# Patient Record
Sex: FEMALE | Race: WHITE | NOT HISPANIC OR LATINO | Employment: OTHER | RURAL
[De-identification: names, ages, dates, MRNs, and addresses within clinical notes are randomized per-mention and may not be internally consistent; named-entity substitution may affect disease eponyms.]

---

## 2021-05-10 RX ORDER — GABAPENTIN 300 MG/1
300 CAPSULE ORAL 2 TIMES DAILY
Qty: 180 CAPSULE | Refills: 1 | Status: SHIPPED | OUTPATIENT
Start: 2021-05-10 | End: 2021-05-25 | Stop reason: SDUPTHER

## 2021-05-25 DIAGNOSIS — G43.709 CHRONIC MIGRAINE W/O AURA, NOT INTRACTABLE, W/O STAT MIGR: Primary | ICD-10-CM

## 2021-05-25 RX ORDER — GABAPENTIN 300 MG/1
300 CAPSULE ORAL 2 TIMES DAILY
Qty: 180 CAPSULE | Refills: 1 | Status: SHIPPED | OUTPATIENT
Start: 2021-05-25 | End: 2021-08-25 | Stop reason: SDUPTHER

## 2021-08-11 RX ORDER — RIZATRIPTAN BENZOATE 10 MG/1
TABLET ORAL
COMMUNITY
Start: 2021-05-03 | End: 2022-02-28 | Stop reason: SDUPTHER

## 2021-08-11 RX ORDER — LEVOTHYROXINE SODIUM 125 UG/1
TABLET ORAL
COMMUNITY
Start: 2021-04-27

## 2021-08-11 RX ORDER — ATORVASTATIN CALCIUM 40 MG/1
TABLET, FILM COATED ORAL
COMMUNITY
Start: 2021-03-10

## 2021-08-25 ENCOUNTER — OFFICE VISIT (OUTPATIENT)
Dept: NEUROLOGY | Facility: CLINIC | Age: 71
End: 2021-08-25
Payer: MEDICARE

## 2021-08-25 VITALS
BODY MASS INDEX: 25.61 KG/M2 | HEART RATE: 52 BPM | SYSTOLIC BLOOD PRESSURE: 112 MMHG | DIASTOLIC BLOOD PRESSURE: 70 MMHG | OXYGEN SATURATION: 99 % | WEIGHT: 150 LBS | HEIGHT: 64 IN | RESPIRATION RATE: 18 BRPM

## 2021-08-25 DIAGNOSIS — G43.709 CHRONIC MIGRAINE W/O AURA, NOT INTRACTABLE, W/O STAT MIGR: ICD-10-CM

## 2021-08-25 PROCEDURE — 99214 OFFICE O/P EST MOD 30 MIN: CPT | Mod: PBBFAC | Performed by: NURSE PRACTITIONER

## 2021-08-25 PROCEDURE — 99213 PR OFFICE/OUTPT VISIT, EST, LEVL III, 20-29 MIN: ICD-10-PCS | Mod: S$PBB,,, | Performed by: NURSE PRACTITIONER

## 2021-08-25 PROCEDURE — 99213 OFFICE O/P EST LOW 20 MIN: CPT | Mod: S$PBB,,, | Performed by: NURSE PRACTITIONER

## 2021-08-25 RX ORDER — GABAPENTIN 300 MG/1
300 CAPSULE ORAL 2 TIMES DAILY
Qty: 180 CAPSULE | Refills: 1 | Status: SHIPPED | OUTPATIENT
Start: 2021-08-25 | End: 2022-06-16

## 2022-02-28 ENCOUNTER — OFFICE VISIT (OUTPATIENT)
Dept: NEUROLOGY | Facility: CLINIC | Age: 72
End: 2022-02-28
Payer: MEDICARE

## 2022-02-28 VITALS
WEIGHT: 149 LBS | HEIGHT: 64 IN | BODY MASS INDEX: 25.44 KG/M2 | SYSTOLIC BLOOD PRESSURE: 114 MMHG | DIASTOLIC BLOOD PRESSURE: 78 MMHG | HEART RATE: 67 BPM | OXYGEN SATURATION: 97 % | RESPIRATION RATE: 18 BRPM

## 2022-02-28 DIAGNOSIS — E55.9 VITAMIN D DEFICIENCY: ICD-10-CM

## 2022-02-28 DIAGNOSIS — G43.709 CHRONIC MIGRAINE W/O AURA, NOT INTRACTABLE, W/O STAT MIGR: Primary | Chronic | ICD-10-CM

## 2022-02-28 DIAGNOSIS — E53.8 VITAMIN B12 DEFICIENCY: ICD-10-CM

## 2022-02-28 PROCEDURE — 99214 OFFICE O/P EST MOD 30 MIN: CPT | Mod: PBBFAC | Performed by: NURSE PRACTITIONER

## 2022-02-28 PROCEDURE — 99213 PR OFFICE/OUTPT VISIT, EST, LEVL III, 20-29 MIN: ICD-10-PCS | Mod: S$PBB,,, | Performed by: NURSE PRACTITIONER

## 2022-02-28 PROCEDURE — 99213 OFFICE O/P EST LOW 20 MIN: CPT | Mod: S$PBB,,, | Performed by: NURSE PRACTITIONER

## 2022-02-28 RX ORDER — RIZATRIPTAN BENZOATE 10 MG/1
TABLET ORAL
Qty: 10 TABLET | Refills: 3 | Status: SHIPPED | OUTPATIENT
Start: 2022-02-28 | End: 2022-06-02

## 2022-02-28 NOTE — PATIENT INSTRUCTIONS
1. Continue neurontin 300mg three times a day as directed    2. Continue maxalt as needed for migraine    3. Vitamin D and B12 labs

## 2022-02-28 NOTE — PROGRESS NOTES
Subjective:       Patient ID: Ciro Gutierrez is a 71 y.o. female     Chief Complaint:    Chief Complaint   Patient presents with    Follow-up    Migraine        Allergies:  Patient has no known allergies.    Current Medications:    Outpatient Encounter Medications as of 2/28/2022   Medication Sig Dispense Refill    atorvastatin (LIPITOR) 40 MG tablet       gabapentin (NEURONTIN) 300 MG capsule Take 1 capsule (300 mg total) by mouth 2 (two) times daily. 180 capsule 1    levothyroxine (SYNTHROID) 125 MCG tablet       [DISCONTINUED] rizatriptan (MAXALT) 10 MG tablet       rizatriptan (MAXALT) 10 MG tablet Take 1 tablet at onset of headache.  May repeat in 2 hours.  No more than 3 days of the week 10 tablet 3     No facility-administered encounter medications on file as of 2/28/2022.       History of Present Illness  72 y/o female with h/o migraines.     Currently on Neurontin 300mg BID and tolerating very well.  Reports maybe 3 headaches a month and easily relieved with Maxalt, typically one dose will eliminate the headache.      Prior on Propranolol which worked well, but she felt too tired.  Prior topamax was avoided due to history of kidney stones.    Denies any other neurological symptoms.         Review of Systems  Review of Systems   Constitutional: Negative for diaphoresis and fever.   HENT: Negative for congestion, hearing loss and tinnitus.    Eyes: Negative for blurred vision, double vision, photophobia, discharge and redness.   Respiratory: Negative for cough and shortness of breath.    Cardiovascular: Negative for chest pain.   Gastrointestinal: Negative for abdominal pain, nausea and vomiting.   Musculoskeletal: Negative for back pain, joint pain, myalgias and neck pain.   Skin: Negative for itching and rash.   Neurological: Positive for headaches. Negative for dizziness, tremors, sensory change, speech change, focal weakness, seizures, loss of consciousness and weakness.    Psychiatric/Behavioral: Negative for depression, hallucinations and memory loss. The patient does not have insomnia.    All other systems reviewed and are negative.     Objective:     NEUROLOGICAL EXAMINATION:     MENTAL STATUS   Oriented to person, place, and time.   Registration: recalls 3 of 3 objects. Recall at 5 minutes: recalls 3 of 3 objects.   Attention: normal. Concentration: normal.   Speech: speech is normal   Level of consciousness: alert  Knowledge: good and consistent with education.   Normal comprehension.     CRANIAL NERVES     CN II   Visual fields full to confrontation.   Visual acuity: normal  Right visual field deficit: none  Left visual field deficit: none     CN III, IV, VI   Pupils are equal, round, and reactive to light.  Extraocular motions are normal.   Right pupil: Size: 3 mm. Shape: regular. Reactivity: brisk. Consensual response: intact. Accommodation: intact.   Left pupil: Size: 3 mm. Shape: regular. Reactivity: brisk. Consensual response: intact. Accommodation: intact.   CN III: no CN III palsy  CN VI: no CN VI palsy  Nystagmus: none   Diplopia: none  Upgaze: normal  Downgaze: normal  Conjugate gaze: present  Vestibulo-ocular reflex: present    CN V   Facial sensation intact.   Right facial sensation deficit: none  Left facial sensation deficit: none  Right corneal reflex: normal  Left corneal reflex: normal    CN VII   Facial expression full, symmetric.   Right facial weakness: none  Left facial weakness: none  Right taste: normal  Left taste: normal    CN VIII   CN VIII normal.   Hearing: intact    CN IX, X   CN IX normal.   CN X normal.   Palate: symmetric    CN XI   CN XI normal.   Right sternocleidomastoid strength: normal  Left sternocleidomastoid strength: normal  Right trapezius strength: normal  Left trapezius strength: normal    CN XII   CN XII normal.   Tongue: not atrophic  Fasciculations: absent  Tongue deviation: none    MOTOR EXAM   Muscle bulk: normal  Overall muscle  tone: normal  Right arm tone: normal  Left arm tone: normal  Right arm pronator drift: absent  Left arm pronator drift: absent  Right leg tone: normal  Left leg tone: normal    Strength   Right neck flexion: 5/5  Left neck flexion: 5/5  Right neck extension: 5/5  Left neck extension: 5/5  Right deltoid: 5/5  Left deltoid: 5/5  Right biceps:   Left biceps:   Right triceps:   Left triceps: /  Right wrist flexion:   Left wrist flexion: /  Right wrist extension: 5/5  Left wrist extension:   Right interossei:   Left interossei:   Right iliopsoas:   Left iliopsoas:   Right quadriceps:   Left quadriceps:   Right hamstrin/5  Left hamstrin/5  Right anterior tibial:   Left anterior tibial:   Right posterior tibial:   Left posterior tibial:   Right gastroc:   Left gastroc:     REFLEXES     Reflexes   Right brachioradialis: 2+  Left brachioradialis: 2+  Right biceps: 2+  Left biceps: 2+  Right triceps: 2+  Left triceps: 2+  Right patellar: 2+  Left patellar: 2+  Right achilles: 2+  Left achilles: 2+  Right plantar: normal  Left plantar: normal  Right Thorne: absent  Left Thorne: absent  Right ankle clonus: absent  Left ankle clonus: absent  Right pendular knee jerk: absent  Left pendular knee jerk: absent    SENSORY EXAM   Light touch normal.   Right arm light touch: normal  Left arm light touch: normal  Right leg light touch: normal  Left leg light touch: normal  Vibration normal.   Right arm vibration: normal  Left arm vibration: normal  Right leg vibration: normal  Left leg vibration: normal  Proprioception normal.   Right arm proprioception: normal  Left arm proprioception: normal  Right leg proprioception: normal  Left leg proprioception: normal  Pinprick normal.   Right arm pinprick: normal  Left arm pinprick: normal  Right leg pinprick: normal  Left leg pinprick: normal  Graphesthesia: normal  Romberg: negative  Stereognosis: normal    GAIT AND COORDINATION      Gait  Gait: normal     Coordination   Finger to nose coordination: normal  Heel to shin coordination: normal  Tandem walking coordination: normal    Tremor   Resting tremor: absent  Intention tremor: absent  Action tremor: absent       Physical Exam  Vitals and nursing note reviewed.   Constitutional:       Appearance: Normal appearance.   HENT:      Head: Normocephalic.   Eyes:      Extraocular Movements: Extraocular movements intact and EOM normal.      Pupils: Pupils are equal, round, and reactive to light.   Cardiovascular:      Rate and Rhythm: Normal rate and regular rhythm.   Pulmonary:      Effort: Pulmonary effort is normal.      Breath sounds: Normal breath sounds.   Musculoskeletal:         General: No swelling or tenderness. Normal range of motion.      Cervical back: Normal range of motion and neck supple.      Right lower leg: No edema.      Left lower leg: No edema.   Skin:     General: Skin is warm and dry.      Coloration: Skin is not jaundiced.      Findings: No rash.   Neurological:      General: No focal deficit present.      Mental Status: She is alert and oriented to person, place, and time.      GCS: GCS eye subscore is 4. GCS verbal subscore is 5. GCS motor subscore is 6.      Cranial Nerves: No cranial nerve deficit.      Sensory: No sensory deficit.      Motor: Motor function is intact. No weakness.      Coordination: Coordination is intact. Coordination normal. Finger-Nose-Finger Test, Heel to Shin Test and Romberg Test normal.      Gait: Gait is intact. Gait and tandem walk normal.      Deep Tendon Reflexes: Reflexes normal.      Reflex Scores:       Tricep reflexes are 2+ on the right side and 2+ on the left side.       Bicep reflexes are 2+ on the right side and 2+ on the left side.       Brachioradialis reflexes are 2+ on the right side and 2+ on the left side.       Patellar reflexes are 2+ on the right side and 2+ on the left side.       Achilles reflexes are 2+ on the right side  and 2+ on the left side.  Psychiatric:         Mood and Affect: Mood normal.         Speech: Speech normal.         Behavior: Behavior normal.          Assessment:     Problem List Items Addressed This Visit        Neuro    Chronic migraine w/o aura, not intractable, w/o stat migr - Primary (Chronic)    Relevant Medications    rizatriptan (MAXALT) 10 MG tablet       Endocrine    Vitamin B12 deficiency    Relevant Orders    Vitamin B12 & Folate    Vitamin D deficiency    Relevant Orders    Vitamin D           Primary Diagnosis and ICD10  Chronic migraine w/o aura, not intractable, w/o stat migr [G43.709]    Plan:     Patient Instructions   1. Continue neurontin 300mg three times a day as directed    2. Continue maxalt as needed for migraine    3. Vitamin D and B12 labs      Medications Discontinued During This Encounter   Medication Reason    rizatriptan (MAXALT) 10 MG tablet Reorder       Requested Prescriptions     Signed Prescriptions Disp Refills    rizatriptan (MAXALT) 10 MG tablet 10 tablet 3     Sig: Take 1 tablet at onset of headache.  May repeat in 2 hours.  No more than 3 days of the week       Orders Placed This Encounter   Procedures    Vitamin D    Vitamin B12 & Folate

## 2022-05-31 DIAGNOSIS — G43.709 CHRONIC MIGRAINE W/O AURA, NOT INTRACTABLE, W/O STAT MIGR: Chronic | ICD-10-CM

## 2022-06-02 RX ORDER — RIZATRIPTAN BENZOATE 10 MG/1
TABLET ORAL
Qty: 36 TABLET | Refills: 2 | Status: SHIPPED | OUTPATIENT
Start: 2022-06-02 | End: 2022-09-29

## 2022-06-15 DIAGNOSIS — G43.709 CHRONIC MIGRAINE W/O AURA, NOT INTRACTABLE, W/O STAT MIGR: ICD-10-CM

## 2022-06-16 RX ORDER — GABAPENTIN 300 MG/1
CAPSULE ORAL
Qty: 180 CAPSULE | Refills: 1 | Status: SHIPPED | OUTPATIENT
Start: 2022-06-16 | End: 2022-09-29 | Stop reason: SDUPTHER

## 2022-09-29 ENCOUNTER — OFFICE VISIT (OUTPATIENT)
Dept: NEUROLOGY | Facility: CLINIC | Age: 72
End: 2022-09-29
Payer: MEDICARE

## 2022-09-29 VITALS
DIASTOLIC BLOOD PRESSURE: 74 MMHG | OXYGEN SATURATION: 98 % | HEART RATE: 57 BPM | BODY MASS INDEX: 26.63 KG/M2 | SYSTOLIC BLOOD PRESSURE: 122 MMHG | RESPIRATION RATE: 18 BRPM | WEIGHT: 156 LBS | HEIGHT: 64 IN

## 2022-09-29 DIAGNOSIS — G43.709 CHRONIC MIGRAINE W/O AURA, NOT INTRACTABLE, W/O STAT MIGR: Primary | Chronic | ICD-10-CM

## 2022-09-29 PROCEDURE — 99213 OFFICE O/P EST LOW 20 MIN: CPT | Mod: S$PBB,,, | Performed by: NURSE PRACTITIONER

## 2022-09-29 PROCEDURE — 99213 PR OFFICE/OUTPT VISIT, EST, LEVL III, 20-29 MIN: ICD-10-PCS | Mod: S$PBB,,, | Performed by: NURSE PRACTITIONER

## 2022-09-29 PROCEDURE — 99214 OFFICE O/P EST MOD 30 MIN: CPT | Mod: PBBFAC | Performed by: NURSE PRACTITIONER

## 2022-09-29 RX ORDER — RIZATRIPTAN BENZOATE 10 MG/1
TABLET ORAL
Qty: 12 TABLET | Refills: 2 | Status: SHIPPED | OUTPATIENT
Start: 2022-09-29 | End: 2023-10-02 | Stop reason: SDUPTHER

## 2022-09-29 RX ORDER — GABAPENTIN 300 MG/1
300 CAPSULE ORAL 2 TIMES DAILY
Qty: 180 CAPSULE | Refills: 1 | Status: SHIPPED | OUTPATIENT
Start: 2022-09-29 | End: 2023-03-29 | Stop reason: SDUPTHER

## 2022-09-29 NOTE — PROGRESS NOTES
Subjective:       Patient ID: Ciro Gutierrez is a 72 y.o. female     Chief Complaint:    Chief Complaint   Patient presents with    Follow-up    Migraine          Allergies:  Patient has no known allergies.    Current Medications:    Outpatient Encounter Medications as of 9/29/2022   Medication Sig Dispense Refill    atorvastatin (LIPITOR) 40 MG tablet       levothyroxine (SYNTHROID) 125 MCG tablet       [DISCONTINUED] gabapentin (NEURONTIN) 300 MG capsule TAKE 1 CAPSULE TWICE DAILY 180 capsule 1    [DISCONTINUED] rizatriptan (MAXALT) 10 MG tablet TAKE 1 TABLET AT ONSET OF HEADACHE.  MAY REPEAT IN 2 HOURS (NO MORE THAN 3 DAYS OF THE WEEK AS INSTRUCTED) 36 tablet 2    gabapentin (NEURONTIN) 300 MG capsule Take 1 capsule (300 mg total) by mouth 2 (two) times daily. 180 capsule 1    rizatriptan (MAXALT) 10 MG tablet TAKE 1 TABLET AT ONSET OF HEADACHE.  MAY REPEAT IN 2 HOURS (NO MORE THAN 3 DAYS OF THE WEEK AS INSTRUCTED) 12 tablet 2     No facility-administered encounter medications on file as of 9/29/2022.       History of Present Illness  73 y/o female with h/o migraines.     Currently on Neurontin 300mg BID and tolerating very well.  Reports maybe 3 headaches a month and easily relieved with Maxalt, typically one dose will eliminate the headache.      Prior on Propranolol which worked well, but she felt too tired.  Prior topamax was avoided due to history of kidney stones.    Denies any other neurological symptoms.         Review of Systems  Review of Systems   Constitutional:  Negative for diaphoresis and fever.   HENT:  Negative for congestion, hearing loss and tinnitus.    Eyes:  Negative for blurred vision, double vision, photophobia, discharge and redness.   Respiratory:  Negative for cough and shortness of breath.    Cardiovascular:  Negative for chest pain.   Gastrointestinal:  Negative for abdominal pain, nausea and vomiting.   Musculoskeletal:  Negative for back pain, joint pain, myalgias and neck  pain.   Skin:  Negative for itching and rash.   Neurological:  Positive for headaches. Negative for dizziness, tremors, sensory change, speech change, focal weakness, seizures, loss of consciousness and weakness.   Psychiatric/Behavioral:  Negative for depression, hallucinations and memory loss. The patient does not have insomnia.    All other systems reviewed and are negative.   Objective:     NEUROLOGICAL EXAMINATION:     MENTAL STATUS   Oriented to person, place, and time.   Registration: recalls 3 of 3 objects. Recall at 5 minutes: recalls 3 of 3 objects.   Attention: normal. Concentration: normal.   Speech: speech is normal   Level of consciousness: alert  Knowledge: good and consistent with education.   Normal comprehension.     CRANIAL NERVES     CN II   Visual fields full to confrontation.   Visual acuity: normal  Right visual field deficit: none  Left visual field deficit: none     CN III, IV, VI   Pupils are equal, round, and reactive to light.  Extraocular motions are normal.   Right pupil: Size: 3 mm. Shape: regular. Reactivity: brisk. Consensual response: intact. Accommodation: intact.   Left pupil: Size: 3 mm. Shape: regular. Reactivity: brisk. Consensual response: intact. Accommodation: intact.   CN III: no CN III palsy  CN VI: no CN VI palsy  Nystagmus: none   Diplopia: none  Upgaze: normal  Downgaze: normal  Conjugate gaze: present  Vestibulo-ocular reflex: present    CN V   Facial sensation intact.   Right facial sensation deficit: none  Left facial sensation deficit: none  Right corneal reflex: normal  Left corneal reflex: normal    CN VII   Facial expression full, symmetric.   Right facial weakness: none  Left facial weakness: none  Right taste: normal  Left taste: normal    CN VIII   CN VIII normal.   Hearing: intact    CN IX, X   CN IX normal.   CN X normal.   Palate: symmetric    CN XI   CN XI normal.   Right sternocleidomastoid strength: normal  Left sternocleidomastoid strength:  normal  Right trapezius strength: normal  Left trapezius strength: normal    CN XII   CN XII normal.   Tongue: not atrophic  Fasciculations: absent  Tongue deviation: none    MOTOR EXAM   Muscle bulk: normal  Overall muscle tone: normal  Right arm tone: normal  Left arm tone: normal  Right arm pronator drift: absent  Left arm pronator drift: absent  Right leg tone: normal  Left leg tone: normal    Strength   Right neck flexion: 5/5  Left neck flexion: 5/5  Right neck extension: 5/5  Left neck extension: 5/5  Right deltoid: 5/5  Left deltoid: 5/5  Right biceps: 5/5  Left biceps: 5/5  Right triceps: 5/5  Left triceps: 5/5  Right wrist flexion: 5/5  Left wrist flexion: 5/5  Right wrist extension: 5/5  Left wrist extension: 5/5  Right interossei: 5/5  Left interossei: 5/5  Right iliopsoas: 5/5  Left iliopsoas: 5/5  Right quadriceps: 5/5  Left quadriceps: 5/5  Right hamstrin/5  Left hamstrin/5  Right anterior tibial: 5/5  Left anterior tibial: 5/5  Right posterior tibial: 5/5  Left posterior tibial: 5/5  Right gastroc: 5/5  Left gastroc: 5/5    REFLEXES     Reflexes   Right brachioradialis: 2+  Left brachioradialis: 2+  Right biceps: 2+  Left biceps: 2+  Right triceps: 2+  Left triceps: 2+  Right patellar: 2+  Left patellar: 2+  Right achilles: 2+  Left achilles: 2+  Right plantar: normal  Left plantar: normal  Right Thorne: absent  Left Thorne: absent  Right ankle clonus: absent  Left ankle clonus: absent  Right pendular knee jerk: absent  Left pendular knee jerk: absent    SENSORY EXAM   Light touch normal.   Right arm light touch: normal  Left arm light touch: normal  Right leg light touch: normal  Left leg light touch: normal  Vibration normal.   Right arm vibration: normal  Left arm vibration: normal  Right leg vibration: normal  Left leg vibration: normal  Proprioception normal.   Right arm proprioception: normal  Left arm proprioception: normal  Right leg proprioception: normal  Left leg proprioception:  normal  Pinprick normal.   Right arm pinprick: normal  Left arm pinprick: normal  Right leg pinprick: normal  Left leg pinprick: normal  Graphesthesia: normal  Romberg: negative  Stereognosis: normal    GAIT AND COORDINATION     Gait  Gait: normal     Coordination   Finger to nose coordination: normal  Heel to shin coordination: normal  Tandem walking coordination: normal    Tremor   Resting tremor: absent  Intention tremor: absent  Action tremor: absent     Physical Exam  Vitals and nursing note reviewed.   Constitutional:       Appearance: Normal appearance.   HENT:      Head: Normocephalic.   Eyes:      Extraocular Movements: Extraocular movements intact and EOM normal.      Pupils: Pupils are equal, round, and reactive to light.   Cardiovascular:      Rate and Rhythm: Normal rate and regular rhythm.   Pulmonary:      Effort: Pulmonary effort is normal.      Breath sounds: Normal breath sounds.   Musculoskeletal:         General: No swelling or tenderness. Normal range of motion.      Cervical back: Normal range of motion and neck supple.      Right lower leg: No edema.      Left lower leg: No edema.   Skin:     General: Skin is warm and dry.      Coloration: Skin is not jaundiced.      Findings: No rash.   Neurological:      General: No focal deficit present.      Mental Status: She is alert and oriented to person, place, and time.      GCS: GCS eye subscore is 4. GCS verbal subscore is 5. GCS motor subscore is 6.      Cranial Nerves: No cranial nerve deficit.      Sensory: No sensory deficit.      Motor: Motor function is intact. No weakness.      Coordination: Coordination is intact. Coordination normal. Finger-Nose-Finger Test, Heel to Shin Test and Romberg Test normal.      Gait: Gait is intact. Gait and tandem walk normal.      Deep Tendon Reflexes: Reflexes normal.      Reflex Scores:       Tricep reflexes are 2+ on the right side and 2+ on the left side.       Bicep reflexes are 2+ on the right side and  2+ on the left side.       Brachioradialis reflexes are 2+ on the right side and 2+ on the left side.       Patellar reflexes are 2+ on the right side and 2+ on the left side.       Achilles reflexes are 2+ on the right side and 2+ on the left side.  Psychiatric:         Mood and Affect: Mood normal.         Speech: Speech normal.         Behavior: Behavior normal.        Assessment:     Problem List Items Addressed This Visit          Neuro    Chronic migraine w/o aura, not intractable, w/o stat migr - Primary (Chronic)    Relevant Medications    gabapentin (NEURONTIN) 300 MG capsule    rizatriptan (MAXALT) 10 MG tablet        Primary Diagnosis and ICD10  Chronic migraine w/o aura, not intractable, w/o stat migr [G43.709]    Plan:     Patient Instructions   1. Continue neurontin 300mg three times a day as directed    2. Continue maxalt as needed for migraine    3. Continue current plan of care    Medications Discontinued During This Encounter   Medication Reason    rizatriptan (MAXALT) 10 MG tablet     gabapentin (NEURONTIN) 300 MG capsule Reorder         Requested Prescriptions     Signed Prescriptions Disp Refills    gabapentin (NEURONTIN) 300 MG capsule 180 capsule 1     Sig: Take 1 capsule (300 mg total) by mouth 2 (two) times daily.    rizatriptan (MAXALT) 10 MG tablet 12 tablet 2     Sig: TAKE 1 TABLET AT ONSET OF HEADACHE.  MAY REPEAT IN 2 HOURS (NO MORE THAN 3 DAYS OF THE WEEK AS INSTRUCTED)       No orders of the defined types were placed in this encounter.

## 2022-09-29 NOTE — PATIENT INSTRUCTIONS
1. Continue neurontin 300mg three times a day as directed    2. Continue maxalt as needed for migraine    3. Continue current plan of care

## 2023-03-29 ENCOUNTER — OFFICE VISIT (OUTPATIENT)
Dept: NEUROLOGY | Facility: CLINIC | Age: 73
End: 2023-03-29
Payer: MEDICARE

## 2023-03-29 VITALS
HEIGHT: 64 IN | BODY MASS INDEX: 26.29 KG/M2 | RESPIRATION RATE: 18 BRPM | SYSTOLIC BLOOD PRESSURE: 130 MMHG | WEIGHT: 154 LBS | OXYGEN SATURATION: 98 % | HEART RATE: 62 BPM | DIASTOLIC BLOOD PRESSURE: 78 MMHG

## 2023-03-29 DIAGNOSIS — G43.709 CHRONIC MIGRAINE W/O AURA, NOT INTRACTABLE, W/O STAT MIGR: Primary | Chronic | ICD-10-CM

## 2023-03-29 PROCEDURE — 99214 OFFICE O/P EST MOD 30 MIN: CPT | Mod: PBBFAC | Performed by: NURSE PRACTITIONER

## 2023-03-29 PROCEDURE — 99213 PR OFFICE/OUTPT VISIT, EST, LEVL III, 20-29 MIN: ICD-10-PCS | Mod: S$PBB,,, | Performed by: NURSE PRACTITIONER

## 2023-03-29 PROCEDURE — 99213 OFFICE O/P EST LOW 20 MIN: CPT | Mod: S$PBB,,, | Performed by: NURSE PRACTITIONER

## 2023-03-29 RX ORDER — GABAPENTIN 300 MG/1
300 CAPSULE ORAL 2 TIMES DAILY
Qty: 180 CAPSULE | Refills: 1 | Status: SHIPPED | OUTPATIENT
Start: 2023-03-29 | End: 2023-08-23

## 2023-03-29 NOTE — PROGRESS NOTES
Subjective:       Patient ID: Ciro Gutierrez is a 72 y.o. female     Chief Complaint:    Chief Complaint   Patient presents with    Follow-up    Migraine          Allergies:  Patient has no known allergies.    Current Medications:    Outpatient Encounter Medications as of 3/29/2023   Medication Sig Dispense Refill    atorvastatin (LIPITOR) 40 MG tablet       levothyroxine (SYNTHROID) 125 MCG tablet       rizatriptan (MAXALT) 10 MG tablet TAKE 1 TABLET AT ONSET OF HEADACHE.  MAY REPEAT IN 2 HOURS (NO MORE THAN 3 DAYS OF THE WEEK AS INSTRUCTED) 12 tablet 2    [DISCONTINUED] gabapentin (NEURONTIN) 300 MG capsule Take 1 capsule (300 mg total) by mouth 2 (two) times daily. 180 capsule 1    gabapentin (NEURONTIN) 300 MG capsule Take 1 capsule (300 mg total) by mouth 2 (two) times daily. 180 capsule 1     No facility-administered encounter medications on file as of 3/29/2023.       History of Present Illness  71 y/o female with h/o migraines.     Currently on Neurontin 300mg BID and tolerating very well.  Reports maybe 3 headaches a month and easily relieved with Maxalt, typically one dose will eliminate the headache.      Prior on Propranolol which worked well, but she felt too tired.  Prior topamax was avoided due to history of kidney stones.    Denies any other neurological symptoms.         Review of Systems  Review of Systems   Constitutional:  Negative for diaphoresis and fever.   HENT:  Negative for congestion, hearing loss and tinnitus.    Eyes:  Negative for blurred vision, double vision, photophobia, discharge and redness.   Respiratory:  Negative for cough and shortness of breath.    Cardiovascular:  Negative for chest pain.   Gastrointestinal:  Negative for abdominal pain, nausea and vomiting.   Musculoskeletal:  Negative for back pain, joint pain, myalgias and neck pain.   Skin:  Negative for itching and rash.   Neurological:  Positive for headaches. Negative for dizziness, tremors, sensory change, speech  change, focal weakness, seizures, loss of consciousness and weakness.   Psychiatric/Behavioral:  Negative for depression, hallucinations and memory loss. The patient does not have insomnia.    All other systems reviewed and are negative.   Objective:     NEUROLOGICAL EXAMINATION:     MENTAL STATUS   Oriented to person, place, and time.   Registration: recalls 3 of 3 objects. Recall at 5 minutes: recalls 3 of 3 objects.   Attention: normal. Concentration: normal.   Speech: speech is normal   Level of consciousness: alert  Knowledge: good and consistent with education.   Normal comprehension.     CRANIAL NERVES     CN II   Visual fields full to confrontation.   Visual acuity: normal  Right visual field deficit: none  Left visual field deficit: none     CN III, IV, VI   Pupils are equal, round, and reactive to light.  Extraocular motions are normal.   Right pupil: Size: 3 mm. Shape: regular. Reactivity: brisk. Consensual response: intact. Accommodation: intact.   Left pupil: Size: 3 mm. Shape: regular. Reactivity: brisk. Consensual response: intact. Accommodation: intact.   CN III: no CN III palsy  CN VI: no CN VI palsy  Nystagmus: none   Diplopia: none  Upgaze: normal  Downgaze: normal  Conjugate gaze: present  Vestibulo-ocular reflex: present    CN V   Facial sensation intact.   Right facial sensation deficit: none  Left facial sensation deficit: none  Right corneal reflex: normal  Left corneal reflex: normal    CN VII   Facial expression full, symmetric.   Right facial weakness: none  Left facial weakness: none  Right taste: normal  Left taste: normal    CN VIII   CN VIII normal.   Hearing: intact    CN IX, X   CN IX normal.   CN X normal.   Palate: symmetric    CN XI   CN XI normal.   Right sternocleidomastoid strength: normal  Left sternocleidomastoid strength: normal  Right trapezius strength: normal  Left trapezius strength: normal    CN XII   CN XII normal.   Tongue: not atrophic  Fasciculations: absent  Tongue  deviation: none    MOTOR EXAM   Muscle bulk: normal  Overall muscle tone: normal  Right arm tone: normal  Left arm tone: normal  Right arm pronator drift: absent  Left arm pronator drift: absent  Right leg tone: normal  Left leg tone: normal    Strength   Right neck flexion: 5/5  Left neck flexion: 5/5  Right neck extension: 5/5  Left neck extension:   Right deltoid:   Left deltoid:   Right biceps:   Left biceps: 5  Right triceps: /5  Left triceps: /  Right wrist flexion:   Left wrist flexion: /  Right wrist extension:   Left wrist extension:   Right interossei:   Left interossei:   Right iliopsoas:   Left iliopsoas:   Right quadriceps:   Left quadriceps:   Right hamstrin/5  Left hamstrin/5  Right anterior tibial:   Left anterior tibial:   Right posterior tibial:   Left posterior tibial:   Right gastroc:   Left gastroc:     REFLEXES     Reflexes   Right brachioradialis: 2+  Left brachioradialis: 2+  Right biceps: 2+  Left biceps: 2+  Right triceps: 2+  Left triceps: 2+  Right patellar: 2+  Left patellar: 2+  Right achilles: 2+  Left achilles: 2+  Right plantar: normal  Left plantar: normal  Right Thorne: absent  Left Thorne: absent  Right ankle clonus: absent  Left ankle clonus: absent  Right pendular knee jerk: absent  Left pendular knee jerk: absent    SENSORY EXAM   Light touch normal.   Right arm light touch: normal  Left arm light touch: normal  Right leg light touch: normal  Left leg light touch: normal  Vibration normal.   Right arm vibration: normal  Left arm vibration: normal  Right leg vibration: normal  Left leg vibration: normal  Proprioception normal.   Right arm proprioception: normal  Left arm proprioception: normal  Right leg proprioception: normal  Left leg proprioception: normal  Pinprick normal.   Right arm pinprick: normal  Left arm pinprick: normal  Right leg pinprick: normal  Left leg pinprick: normal  Graphesthesia:  normal  Romberg: negative  Stereognosis: normal    GAIT AND COORDINATION     Gait  Gait: normal     Coordination   Finger to nose coordination: normal  Heel to shin coordination: normal  Tandem walking coordination: normal    Tremor   Resting tremor: absent  Intention tremor: absent  Action tremor: absent     Physical Exam  Vitals and nursing note reviewed.   Constitutional:       Appearance: Normal appearance.   HENT:      Head: Normocephalic.   Eyes:      Extraocular Movements: Extraocular movements intact and EOM normal.      Pupils: Pupils are equal, round, and reactive to light.   Cardiovascular:      Rate and Rhythm: Normal rate and regular rhythm.   Pulmonary:      Effort: Pulmonary effort is normal.      Breath sounds: Normal breath sounds.   Musculoskeletal:         General: No swelling or tenderness. Normal range of motion.      Cervical back: Normal range of motion and neck supple.      Right lower leg: No edema.      Left lower leg: No edema.   Skin:     General: Skin is warm and dry.      Coloration: Skin is not jaundiced.      Findings: No rash.   Neurological:      General: No focal deficit present.      Mental Status: She is alert and oriented to person, place, and time.      GCS: GCS eye subscore is 4. GCS verbal subscore is 5. GCS motor subscore is 6.      Cranial Nerves: No cranial nerve deficit.      Sensory: No sensory deficit.      Motor: Motor function is intact. No weakness.      Coordination: Coordination is intact. Coordination normal. Finger-Nose-Finger Test, Heel to Shin Test and Romberg Test normal.      Gait: Gait is intact. Gait and tandem walk normal.      Deep Tendon Reflexes: Reflexes normal.      Reflex Scores:       Tricep reflexes are 2+ on the right side and 2+ on the left side.       Bicep reflexes are 2+ on the right side and 2+ on the left side.       Brachioradialis reflexes are 2+ on the right side and 2+ on the left side.       Patellar reflexes are 2+ on the right side  and 2+ on the left side.       Achilles reflexes are 2+ on the right side and 2+ on the left side.  Psychiatric:         Mood and Affect: Mood normal.         Speech: Speech normal.         Behavior: Behavior normal.        Assessment:     Problem List Items Addressed This Visit          Neuro    Chronic migraine w/o aura, not intractable, w/o stat migr - Primary (Chronic)    Relevant Medications    gabapentin (NEURONTIN) 300 MG capsule          Primary Diagnosis and ICD10  Chronic migraine w/o aura, not intractable, w/o stat migr [G43.709]    Plan:     Patient Instructions   1. Continue neurontin 300mg three times a day as directed    2. Continue maxalt as needed for migraine    3. Continue current plan of care    Medications Discontinued During This Encounter   Medication Reason    gabapentin (NEURONTIN) 300 MG capsule Reorder           Requested Prescriptions     Signed Prescriptions Disp Refills    gabapentin (NEURONTIN) 300 MG capsule 180 capsule 1     Sig: Take 1 capsule (300 mg total) by mouth 2 (two) times daily.       No orders of the defined types were placed in this encounter.

## 2023-08-23 DIAGNOSIS — G43.709 CHRONIC MIGRAINE W/O AURA, NOT INTRACTABLE, W/O STAT MIGR: Chronic | ICD-10-CM

## 2023-08-23 RX ORDER — GABAPENTIN 300 MG/1
300 CAPSULE ORAL 2 TIMES DAILY
Qty: 180 CAPSULE | Refills: 1 | Status: SHIPPED | OUTPATIENT
Start: 2023-08-23 | End: 2023-09-21 | Stop reason: SDUPTHER

## 2023-09-21 DIAGNOSIS — G43.709 CHRONIC MIGRAINE W/O AURA, NOT INTRACTABLE, W/O STAT MIGR: Chronic | ICD-10-CM

## 2023-09-21 RX ORDER — GABAPENTIN 300 MG/1
300 CAPSULE ORAL 2 TIMES DAILY
Qty: 180 CAPSULE | Refills: 1 | Status: SHIPPED | OUTPATIENT
Start: 2023-09-21 | End: 2023-09-25

## 2023-09-22 DIAGNOSIS — G43.709 CHRONIC MIGRAINE W/O AURA, NOT INTRACTABLE, W/O STAT MIGR: Chronic | ICD-10-CM

## 2023-09-25 RX ORDER — GABAPENTIN 300 MG/1
CAPSULE ORAL
Qty: 180 CAPSULE | Refills: 3 | Status: SHIPPED | OUTPATIENT
Start: 2023-09-25 | End: 2023-10-02 | Stop reason: SDUPTHER

## 2023-10-02 ENCOUNTER — OFFICE VISIT (OUTPATIENT)
Dept: NEUROLOGY | Facility: CLINIC | Age: 73
End: 2023-10-02
Payer: MEDICARE

## 2023-10-02 VITALS
OXYGEN SATURATION: 97 % | WEIGHT: 165 LBS | BODY MASS INDEX: 28.17 KG/M2 | HEIGHT: 64 IN | DIASTOLIC BLOOD PRESSURE: 82 MMHG | SYSTOLIC BLOOD PRESSURE: 112 MMHG | HEART RATE: 61 BPM

## 2023-10-02 DIAGNOSIS — G43.709 CHRONIC MIGRAINE W/O AURA, NOT INTRACTABLE, W/O STAT MIGR: Primary | Chronic | ICD-10-CM

## 2023-10-02 PROCEDURE — 99213 OFFICE O/P EST LOW 20 MIN: CPT | Mod: S$PBB,,, | Performed by: NURSE PRACTITIONER

## 2023-10-02 PROCEDURE — 99214 OFFICE O/P EST MOD 30 MIN: CPT | Mod: PBBFAC | Performed by: NURSE PRACTITIONER

## 2023-10-02 PROCEDURE — 99213 PR OFFICE/OUTPT VISIT, EST, LEVL III, 20-29 MIN: ICD-10-PCS | Mod: S$PBB,,, | Performed by: NURSE PRACTITIONER

## 2023-10-02 RX ORDER — GABAPENTIN 300 MG/1
300 CAPSULE ORAL 2 TIMES DAILY
Qty: 60 CAPSULE | Refills: 0 | Status: SHIPPED | OUTPATIENT
Start: 2023-10-02 | End: 2024-04-02

## 2023-10-02 RX ORDER — GABAPENTIN 300 MG/1
CAPSULE ORAL
Qty: 180 CAPSULE | Refills: 3 | Status: SHIPPED | OUTPATIENT
Start: 2023-10-02 | End: 2023-11-20

## 2023-10-02 RX ORDER — CELECOXIB 200 MG/1
CAPSULE ORAL
COMMUNITY
Start: 2023-08-23

## 2023-10-02 RX ORDER — RIZATRIPTAN BENZOATE 10 MG/1
TABLET ORAL
Qty: 12 TABLET | Refills: 2 | Status: SHIPPED | OUTPATIENT
Start: 2023-10-02

## 2023-10-02 NOTE — PROGRESS NOTES
Subjective:       Patient ID: Ciro Gutierrez is a 73 y.o. female     Chief Complaint:    Chief Complaint   Patient presents with    Follow-up          Allergies:  Patient has no known allergies.    Current Medications:    Outpatient Encounter Medications as of 10/2/2023   Medication Sig Dispense Refill    atorvastatin (LIPITOR) 40 MG tablet       celecoxib (CELEBREX) 200 MG capsule       gabapentin (NEURONTIN) 300 MG capsule TAKE 1 CAPSULE  (300MG  TOTAL) BY MOUTH TWO TIMES DAILY 180 capsule 3    levothyroxine (SYNTHROID) 125 MCG tablet       [DISCONTINUED] rizatriptan (MAXALT) 10 MG tablet TAKE 1 TABLET AT ONSET OF HEADACHE.  MAY REPEAT IN 2 HOURS (NO MORE THAN 3 DAYS OF THE WEEK AS INSTRUCTED) 12 tablet 2    rizatriptan (MAXALT) 10 MG tablet TAKE 1 TABLET AT ONSET OF HEADACHE.  MAY REPEAT IN 2 HOURS (NO MORE THAN 3 DAYS OF THE WEEK AS INSTRUCTED) 12 tablet 2    [DISCONTINUED] gabapentin (NEURONTIN) 300 MG capsule TAKE 1 CAPSULE TWICE DAILY 180 capsule 1    [DISCONTINUED] gabapentin (NEURONTIN) 300 MG capsule Take 1 capsule (300 mg total) by mouth 2 (two) times daily. 180 capsule 1     No facility-administered encounter medications on file as of 10/2/2023.       History of Present Illness  72 y/o female with h/o migraines.     Currently on Neurontin 300mg BID and tolerating very well.  Reports maybe 3 headaches a month and easily relieved with Maxalt, typically one dose will eliminate the headache.      Prior on Propranolol which worked well, but she felt too tired.  Prior topamax was avoided due to history of kidney stones.    Denies any other neurological symptoms.           Review of Systems  Review of Systems   Constitutional:  Negative for diaphoresis and fever.   HENT:  Negative for congestion, hearing loss and tinnitus.    Eyes:  Negative for blurred vision, double vision, photophobia, discharge and redness.   Respiratory:  Negative for cough and shortness of breath.    Cardiovascular:  Negative for  chest pain.   Gastrointestinal:  Negative for abdominal pain, nausea and vomiting.   Musculoskeletal:  Negative for back pain, joint pain, myalgias and neck pain.   Skin:  Negative for itching and rash.   Neurological:  Positive for headaches. Negative for dizziness, tremors, sensory change, speech change, focal weakness, seizures, loss of consciousness and weakness.   Psychiatric/Behavioral:  Negative for depression, hallucinations and memory loss. The patient does not have insomnia.    All other systems reviewed and are negative.     Objective:     NEUROLOGICAL EXAMINATION:     MENTAL STATUS   Oriented to person, place, and time.   Registration: recalls 3 of 3 objects. Recall at 5 minutes: recalls 3 of 3 objects.   Attention: normal. Concentration: normal.   Speech: speech is normal   Level of consciousness: alert  Knowledge: good and consistent with education.   Normal comprehension.     CRANIAL NERVES     CN II   Visual fields full to confrontation.   Visual acuity: normal  Right visual field deficit: none  Left visual field deficit: none     CN III, IV, VI   Pupils are equal, round, and reactive to light.  Extraocular motions are normal.   Right pupil: Size: 3 mm. Shape: regular. Reactivity: brisk. Consensual response: intact. Accommodation: intact.   Left pupil: Size: 3 mm. Shape: regular. Reactivity: brisk. Consensual response: intact. Accommodation: intact.   CN III: no CN III palsy  CN VI: no CN VI palsy  Nystagmus: none   Diplopia: none  Upgaze: normal  Downgaze: normal  Conjugate gaze: present  Vestibulo-ocular reflex: present    CN V   Facial sensation intact.   Right facial sensation deficit: none  Left facial sensation deficit: none  Right corneal reflex: normal  Left corneal reflex: normal    CN VII   Facial expression full, symmetric.   Right facial weakness: none  Left facial weakness: none  Right taste: normal  Left taste: normal    CN VIII   CN VIII normal.   Hearing: intact    CN IX, X   CN IX  normal.   CN X normal.   Palate: symmetric    CN XI   CN XI normal.   Right sternocleidomastoid strength: normal  Left sternocleidomastoid strength: normal  Right trapezius strength: normal  Left trapezius strength: normal    CN XII   CN XII normal.   Tongue: not atrophic  Fasciculations: absent  Tongue deviation: none    MOTOR EXAM   Muscle bulk: normal  Overall muscle tone: normal  Right arm tone: normal  Left arm tone: normal  Right arm pronator drift: absent  Left arm pronator drift: absent  Right leg tone: normal  Left leg tone: normal    Strength   Right neck flexion: 5/5  Left neck flexion: 5/5  Right neck extension: 5/5  Left neck extension: 5/5  Right deltoid: 5/5  Left deltoid: 5/5  Right biceps: 5/5  Left biceps: 5/5  Right triceps: 5/5  Left triceps: 5/5  Right wrist flexion: 5/5  Left wrist flexion: 5/5  Right wrist extension: 5/5  Left wrist extension: 5/5  Right interossei: 5/5  Left interossei: 5/5  Right iliopsoas: 5/5  Left iliopsoas: 5/5  Right quadriceps: 5/5  Left quadriceps: 5/5  Right hamstrin/5  Left hamstrin/5  Right anterior tibial: 5/5  Left anterior tibial: 5/5  Right posterior tibial: 5/5  Left posterior tibial: 5/5  Right gastroc: 5/5  Left gastroc: 5/5    REFLEXES     Reflexes   Right brachioradialis: 2+  Left brachioradialis: 2+  Right biceps: 2+  Left biceps: 2+  Right triceps: 2+  Left triceps: 2+  Right patellar: 2+  Left patellar: 2+  Right achilles: 2+  Left achilles: 2+  Right plantar: normal  Left plantar: normal  Right Thorne: absent  Left Thorne: absent  Right ankle clonus: absent  Left ankle clonus: absent  Right pendular knee jerk: absent  Left pendular knee jerk: absent    SENSORY EXAM   Light touch normal.   Right arm light touch: normal  Left arm light touch: normal  Right leg light touch: normal  Left leg light touch: normal  Vibration normal.   Right arm vibration: normal  Left arm vibration: normal  Right leg vibration: normal  Left leg vibration:  normal  Proprioception normal.   Right arm proprioception: normal  Left arm proprioception: normal  Right leg proprioception: normal  Left leg proprioception: normal  Pinprick normal.   Right arm pinprick: normal  Left arm pinprick: normal  Right leg pinprick: normal  Left leg pinprick: normal  Graphesthesia: normal  Romberg: negative  Stereognosis: normal    GAIT AND COORDINATION     Gait  Gait: normal     Coordination   Finger to nose coordination: normal  Heel to shin coordination: normal  Tandem walking coordination: normal    Tremor   Resting tremor: absent  Intention tremor: absent  Action tremor: absent       Physical Exam  Vitals and nursing note reviewed.   Constitutional:       Appearance: Normal appearance.   HENT:      Head: Normocephalic.   Eyes:      Extraocular Movements: Extraocular movements intact and EOM normal.      Pupils: Pupils are equal, round, and reactive to light.   Cardiovascular:      Rate and Rhythm: Normal rate and regular rhythm.   Pulmonary:      Effort: Pulmonary effort is normal.      Breath sounds: Normal breath sounds.   Musculoskeletal:         General: No swelling or tenderness. Normal range of motion.      Cervical back: Normal range of motion and neck supple.      Right lower leg: No edema.      Left lower leg: No edema.   Skin:     General: Skin is warm and dry.      Coloration: Skin is not jaundiced.      Findings: No rash.   Neurological:      General: No focal deficit present.      Mental Status: She is alert and oriented to person, place, and time.      GCS: GCS eye subscore is 4. GCS verbal subscore is 5. GCS motor subscore is 6.      Cranial Nerves: No cranial nerve deficit.      Sensory: No sensory deficit.      Motor: Motor function is intact. No weakness.      Coordination: Coordination is intact. Coordination normal. Finger-Nose-Finger Test, Heel to Shin Test and Romberg Test normal.      Gait: Gait is intact. Gait and tandem walk normal.      Deep Tendon Reflexes:  Reflexes normal.      Reflex Scores:       Tricep reflexes are 2+ on the right side and 2+ on the left side.       Bicep reflexes are 2+ on the right side and 2+ on the left side.       Brachioradialis reflexes are 2+ on the right side and 2+ on the left side.       Patellar reflexes are 2+ on the right side and 2+ on the left side.       Achilles reflexes are 2+ on the right side and 2+ on the left side.  Psychiatric:         Mood and Affect: Mood normal.         Speech: Speech normal.         Behavior: Behavior normal.          Assessment:     Problem List Items Addressed This Visit          Neuro    Chronic migraine w/o aura, not intractable, w/o stat migr - Primary (Chronic)    Relevant Medications    rizatriptan (MAXALT) 10 MG tablet            Primary Diagnosis and ICD10  Chronic migraine w/o aura, not intractable, w/o stat migr [G43.709]    Plan:     Patient Instructions   1. Continue neurontin 300mg three times a day as directed    2. Continue maxalt as needed for migraine    3. Continue current plan of care    Medications Discontinued During This Encounter   Medication Reason    rizatriptan (MAXALT) 10 MG tablet Reorder             Requested Prescriptions     Signed Prescriptions Disp Refills    rizatriptan (MAXALT) 10 MG tablet 12 tablet 2     Sig: TAKE 1 TABLET AT ONSET OF HEADACHE.  MAY REPEAT IN 2 HOURS (NO MORE THAN 3 DAYS OF THE WEEK AS INSTRUCTED)       No orders of the defined types were placed in this encounter.

## 2023-11-18 DIAGNOSIS — G43.709 CHRONIC MIGRAINE W/O AURA, NOT INTRACTABLE, W/O STAT MIGR: Chronic | ICD-10-CM

## 2023-11-20 RX ORDER — GABAPENTIN 300 MG/1
CAPSULE ORAL
Qty: 60 CAPSULE | Refills: 5 | Status: SHIPPED | OUTPATIENT
Start: 2023-11-20 | End: 2023-11-27

## 2023-11-22 DIAGNOSIS — G43.709 CHRONIC MIGRAINE W/O AURA, NOT INTRACTABLE, W/O STAT MIGR: Chronic | ICD-10-CM

## 2023-11-27 RX ORDER — GABAPENTIN 300 MG/1
CAPSULE ORAL
Qty: 60 CAPSULE | Refills: 5 | Status: SHIPPED | OUTPATIENT
Start: 2023-11-27 | End: 2024-01-23 | Stop reason: SDUPTHER

## 2024-01-23 DIAGNOSIS — G43.709 CHRONIC MIGRAINE W/O AURA, NOT INTRACTABLE, W/O STAT MIGR: Chronic | ICD-10-CM

## 2024-01-23 RX ORDER — GABAPENTIN 300 MG/1
300 CAPSULE ORAL 2 TIMES DAILY
Qty: 180 CAPSULE | Refills: 3 | Status: SHIPPED | OUTPATIENT
Start: 2024-01-23 | End: 2024-04-02

## 2024-04-02 ENCOUNTER — OFFICE VISIT (OUTPATIENT)
Dept: NEUROLOGY | Facility: CLINIC | Age: 74
End: 2024-04-02
Payer: MEDICARE

## 2024-04-02 VITALS
BODY MASS INDEX: 27.83 KG/M2 | OXYGEN SATURATION: 97 % | WEIGHT: 163 LBS | HEIGHT: 64 IN | DIASTOLIC BLOOD PRESSURE: 74 MMHG | SYSTOLIC BLOOD PRESSURE: 118 MMHG | RESPIRATION RATE: 18 BRPM | HEART RATE: 59 BPM

## 2024-04-02 DIAGNOSIS — G43.709 CHRONIC MIGRAINE W/O AURA, NOT INTRACTABLE, W/O STAT MIGR: Primary | ICD-10-CM

## 2024-04-02 PROCEDURE — 99214 OFFICE O/P EST MOD 30 MIN: CPT | Mod: PBBFAC | Performed by: NURSE PRACTITIONER

## 2024-04-02 PROCEDURE — 99212 OFFICE O/P EST SF 10 MIN: CPT | Mod: S$PBB,,, | Performed by: NURSE PRACTITIONER

## 2024-04-02 RX ORDER — GABAPENTIN 300 MG/1
300 CAPSULE ORAL 2 TIMES DAILY
Qty: 180 CAPSULE | Refills: 3 | Status: SHIPPED | OUTPATIENT
Start: 2024-04-02

## 2024-04-02 NOTE — PROGRESS NOTES
Subjective:       Patient ID: Ciro Gutierrez is a 73 y.o. female     Chief Complaint:    Chief Complaint   Patient presents with    Follow-up    Migraine          Allergies:  Patient has no known allergies.    Current Medications:    Outpatient Encounter Medications as of 4/2/2024   Medication Sig Dispense Refill    atorvastatin (LIPITOR) 40 MG tablet       celecoxib (CELEBREX) 200 MG capsule       levothyroxine (SYNTHROID) 125 MCG tablet       rizatriptan (MAXALT) 10 MG tablet TAKE 1 TABLET AT ONSET OF HEADACHE.  MAY REPEAT IN 2 HOURS (NO MORE THAN 3 DAYS OF THE WEEK AS INSTRUCTED) 12 tablet 2    [DISCONTINUED] gabapentin (NEURONTIN) 300 MG capsule Take 1 capsule (300 mg total) by mouth 2 (two) times daily. 60 capsule 0    gabapentin (NEURONTIN) 300 MG capsule Take 1 capsule (300 mg total) by mouth 2 (two) times daily. 180 capsule 3    [DISCONTINUED] gabapentin (NEURONTIN) 300 MG capsule Take 1 capsule (300 mg total) by mouth 2 (two) times daily. (Patient not taking: Reported on 4/2/2024) 180 capsule 3     No facility-administered encounter medications on file as of 4/2/2024.       History of Present Illness  72 y/o female with h/o migraines.     Currently on Neurontin 300mg BID and tolerating very well.  Reports maybe 3 headaches a month and easily relieved with Maxalt, typically one dose will eliminate the headache.      Prior on Propranolol which worked well, but she felt too tired.  Prior topamax was avoided due to history of kidney stones.    Denies any other neurological symptoms.           Review of Systems  Review of Systems   Constitutional:  Negative for diaphoresis and fever.   HENT:  Negative for congestion, hearing loss and tinnitus.    Eyes:  Negative for blurred vision, double vision, photophobia, discharge and redness.   Respiratory:  Negative for cough and shortness of breath.    Cardiovascular:  Negative for chest pain.   Gastrointestinal:  Negative for abdominal pain, nausea and vomiting.    Musculoskeletal:  Negative for back pain, joint pain, myalgias and neck pain.   Skin:  Negative for itching and rash.   Neurological:  Positive for headaches. Negative for dizziness, tremors, sensory change, speech change, focal weakness, seizures, loss of consciousness and weakness.   Psychiatric/Behavioral:  Negative for depression, hallucinations and memory loss. The patient does not have insomnia.    All other systems reviewed and are negative.     Objective:     NEUROLOGICAL EXAMINATION:     MENTAL STATUS   Oriented to person, place, and time.   Registration: recalls 3 of 3 objects. Recall at 5 minutes: recalls 3 of 3 objects.   Attention: normal. Concentration: normal.   Speech: speech is normal   Level of consciousness: alert  Knowledge: good and consistent with education.   Normal comprehension.     CRANIAL NERVES     CN II   Visual fields full to confrontation.   Visual acuity: normal  Right visual field deficit: none  Left visual field deficit: none     CN III, IV, VI   Pupils are equal, round, and reactive to light.  Extraocular motions are normal.   Right pupil: Size: 3 mm. Shape: regular. Reactivity: brisk. Consensual response: intact. Accommodation: intact.   Left pupil: Size: 3 mm. Shape: regular. Reactivity: brisk. Consensual response: intact. Accommodation: intact.   CN III: no CN III palsy  CN VI: no CN VI palsy  Nystagmus: none   Diplopia: none  Upgaze: normal  Downgaze: normal  Conjugate gaze: present  Vestibulo-ocular reflex: present    CN V   Facial sensation intact.   Right facial sensation deficit: none  Left facial sensation deficit: none  Right corneal reflex: normal  Left corneal reflex: normal    CN VII   Facial expression full, symmetric.   Right facial weakness: none  Left facial weakness: none  Right taste: normal  Left taste: normal    CN VIII   CN VIII normal.   Hearing: intact    CN IX, X   CN IX normal.   CN X normal.   Palate: symmetric    CN XI   CN XI normal.   Right  sternocleidomastoid strength: normal  Left sternocleidomastoid strength: normal  Right trapezius strength: normal  Left trapezius strength: normal    CN XII   CN XII normal.   Tongue: not atrophic  Fasciculations: absent  Tongue deviation: none    MOTOR EXAM   Muscle bulk: normal  Overall muscle tone: normal  Right arm tone: normal  Left arm tone: normal  Right arm pronator drift: absent  Left arm pronator drift: absent  Right leg tone: normal  Left leg tone: normal    Strength   Right neck flexion: 5/5  Left neck flexion: 5/5  Right neck extension: 5/5  Left neck extension: 5/5  Right deltoid: 5/5  Left deltoid: 5/5  Right biceps: 5/5  Left biceps: 5/5  Right triceps: 5/5  Left triceps: 5/5  Right wrist flexion: 5/5  Left wrist flexion: 5/5  Right wrist extension: 5/5  Left wrist extension: 5/5  Right interossei: 5/5  Left interossei: 5/5  Right iliopsoas: 5/5  Left iliopsoas: 5/5  Right quadriceps: 5/5  Left quadriceps: 5/5  Right hamstrin/5  Left hamstrin/5  Right anterior tibial: 5/5  Left anterior tibial: 5/5  Right posterior tibial: 5/5  Left posterior tibial: 5/5  Right gastroc: 5/5  Left gastroc: 5/5    REFLEXES     Reflexes   Right brachioradialis: 2+  Left brachioradialis: 2+  Right biceps: 2+  Left biceps: 2+  Right triceps: 2+  Left triceps: 2+  Right patellar: 2+  Left patellar: 2+  Right achilles: 2+  Left achilles: 2+  Right plantar: normal  Left plantar: normal  Right Thorne: absent  Left Thorne: absent  Right ankle clonus: absent  Left ankle clonus: absent  Right pendular knee jerk: absent  Left pendular knee jerk: absent    SENSORY EXAM   Light touch normal.   Right arm light touch: normal  Left arm light touch: normal  Right leg light touch: normal  Left leg light touch: normal  Vibration normal.   Right arm vibration: normal  Left arm vibration: normal  Right leg vibration: normal  Left leg vibration: normal  Proprioception normal.   Right arm proprioception: normal  Left arm  proprioception: normal  Right leg proprioception: normal  Left leg proprioception: normal  Pinprick normal.   Right arm pinprick: normal  Left arm pinprick: normal  Right leg pinprick: normal  Left leg pinprick: normal  Graphesthesia: normal  Romberg: negative  Stereognosis: normal    GAIT AND COORDINATION     Gait  Gait: normal     Coordination   Finger to nose coordination: normal  Heel to shin coordination: normal  Tandem walking coordination: normal    Tremor   Resting tremor: absent  Intention tremor: absent  Action tremor: absent       Physical Exam  Vitals and nursing note reviewed.   Constitutional:       Appearance: Normal appearance.   HENT:      Head: Normocephalic.   Eyes:      Extraocular Movements: Extraocular movements intact and EOM normal.      Pupils: Pupils are equal, round, and reactive to light.   Cardiovascular:      Rate and Rhythm: Normal rate and regular rhythm.   Pulmonary:      Effort: Pulmonary effort is normal.      Breath sounds: Normal breath sounds.   Musculoskeletal:         General: No swelling or tenderness. Normal range of motion.      Cervical back: Normal range of motion and neck supple.      Right lower leg: No edema.      Left lower leg: No edema.   Skin:     General: Skin is warm and dry.      Coloration: Skin is not jaundiced.      Findings: No rash.   Neurological:      General: No focal deficit present.      Mental Status: She is alert and oriented to person, place, and time.      GCS: GCS eye subscore is 4. GCS verbal subscore is 5. GCS motor subscore is 6.      Cranial Nerves: No cranial nerve deficit.      Sensory: No sensory deficit.      Motor: Motor function is intact. No weakness.      Coordination: Coordination is intact. Coordination normal. Finger-Nose-Finger Test, Heel to Shin Test and Romberg Test normal.      Gait: Gait is intact. Gait and tandem walk normal.      Deep Tendon Reflexes: Reflexes normal.      Reflex Scores:       Tricep reflexes are 2+ on the  right side and 2+ on the left side.       Bicep reflexes are 2+ on the right side and 2+ on the left side.       Brachioradialis reflexes are 2+ on the right side and 2+ on the left side.       Patellar reflexes are 2+ on the right side and 2+ on the left side.       Achilles reflexes are 2+ on the right side and 2+ on the left side.  Psychiatric:         Mood and Affect: Mood normal.         Speech: Speech normal.         Behavior: Behavior normal.          Assessment:     Problem List Items Addressed This Visit          Neuro    Chronic migraine w/o aura, not intractable, w/o stat migr - Primary (Chronic)    Relevant Medications    gabapentin (NEURONTIN) 300 MG capsule              Primary Diagnosis and ICD10  Chronic migraine w/o aura, not intractable, w/o stat migr [G43.709]    Plan:     Patient Instructions   1. Continue neurontin 300mg three times a day as directed    2. Continue maxalt as needed for migraine    3. Continue current plan of care    Medications Discontinued During This Encounter   Medication Reason    gabapentin (NEURONTIN) 300 MG capsule     gabapentin (NEURONTIN) 300 MG capsule                Requested Prescriptions     Signed Prescriptions Disp Refills    gabapentin (NEURONTIN) 300 MG capsule 180 capsule 3     Sig: Take 1 capsule (300 mg total) by mouth 2 (two) times daily.       No orders of the defined types were placed in this encounter.

## 2024-10-02 ENCOUNTER — OFFICE VISIT (OUTPATIENT)
Dept: NEUROLOGY | Facility: CLINIC | Age: 74
End: 2024-10-02
Payer: MEDICARE

## 2024-10-02 VITALS
WEIGHT: 166.19 LBS | OXYGEN SATURATION: 97 % | HEIGHT: 64 IN | HEART RATE: 62 BPM | SYSTOLIC BLOOD PRESSURE: 143 MMHG | DIASTOLIC BLOOD PRESSURE: 72 MMHG | BODY MASS INDEX: 28.37 KG/M2

## 2024-10-02 DIAGNOSIS — G43.709 CHRONIC MIGRAINE W/O AURA, NOT INTRACTABLE, W/O STAT MIGR: Primary | ICD-10-CM

## 2024-10-02 PROCEDURE — 99212 OFFICE O/P EST SF 10 MIN: CPT | Mod: S$PBB,,, | Performed by: NURSE PRACTITIONER

## 2024-10-02 PROCEDURE — 99999 PR PBB SHADOW E&M-EST. PATIENT-LVL IV: CPT | Mod: PBBFAC,,, | Performed by: NURSE PRACTITIONER

## 2024-10-02 PROCEDURE — 99214 OFFICE O/P EST MOD 30 MIN: CPT | Mod: PBBFAC | Performed by: NURSE PRACTITIONER

## 2024-10-02 RX ORDER — GABAPENTIN 300 MG/1
300 CAPSULE ORAL 2 TIMES DAILY
Qty: 180 CAPSULE | Refills: 1 | Status: SHIPPED | OUTPATIENT
Start: 2024-10-02

## 2024-10-02 RX ORDER — ASCORBIC ACID 250 MG
1 TABLET,CHEWABLE ORAL DAILY
COMMUNITY

## 2024-10-02 RX ORDER — DOCUSATE SODIUM 100 MG/1
CAPSULE, LIQUID FILLED ORAL
COMMUNITY

## 2024-10-02 NOTE — PROGRESS NOTES
Subjective:       Patient ID: Ciro Gutierrez is a 74 y.o. female     Chief Complaint:    Chief Complaint   Patient presents with    Follow-up          Allergies:  Patient has no known allergies.    Current Medications:    Outpatient Encounter Medications as of 10/2/2024   Medication Sig Dispense Refill    ascorbic acid, vitamin C, 250 mg Chew Take 1 tablet by mouth once daily.      atorvastatin (LIPITOR) 40 MG tablet       celecoxib (CELEBREX) 200 MG capsule       docusate sodium (COLACE) 100 MG capsule Take 1 capsule twice a day by oral route.      gabapentin (NEURONTIN) 300 MG capsule Take 1 capsule (300 mg total) by mouth 2 (two) times daily. 180 capsule 3    levothyroxine (SYNTHROID) 125 MCG tablet       rizatriptan (MAXALT) 10 MG tablet TAKE 1 TABLET AT ONSET OF HEADACHE.  MAY REPEAT IN 2 HOURS (NO MORE THAN 3 DAYS OF THE WEEK AS INSTRUCTED) 12 tablet 2     No facility-administered encounter medications on file as of 10/2/2024.       History of Present Illness  75 y/o female with h/o migraines.     Currently on Neurontin 300mg BID and tolerating very well.  Reports maybe 3 headaches a month and easily relieved with Maxalt, typically one dose will eliminate the headache.      Prior on Propranolol which worked well, but she felt too tired.  Prior topamax was avoided due to history of kidney stones.    Denies any other neurological symptoms.           Review of Systems  Review of Systems   Constitutional:  Negative for diaphoresis and fever.   HENT:  Negative for congestion, hearing loss and tinnitus.    Eyes:  Negative for blurred vision, double vision, photophobia, discharge and redness.   Respiratory:  Negative for cough and shortness of breath.    Cardiovascular:  Negative for chest pain.   Gastrointestinal:  Negative for abdominal pain, nausea and vomiting.   Musculoskeletal:  Negative for back pain, joint pain, myalgias and neck pain.   Skin:  Negative for itching and rash.   Neurological:  Positive for  headaches. Negative for dizziness, tremors, sensory change, speech change, focal weakness, seizures, loss of consciousness and weakness.   Psychiatric/Behavioral:  Negative for depression, hallucinations and memory loss. The patient does not have insomnia.    All other systems reviewed and are negative.     Objective:     NEUROLOGICAL EXAMINATION:     MENTAL STATUS   Oriented to person, place, and time.   Registration: recalls 3 of 3 objects. Recall at 5 minutes: recalls 3 of 3 objects.   Attention: normal. Concentration: normal.   Speech: speech is normal   Level of consciousness: alert  Knowledge: good and consistent with education.   Normal comprehension.     CRANIAL NERVES     CN II   Visual fields full to confrontation.   Visual acuity: normal  Right visual field deficit: none  Left visual field deficit: none     CN III, IV, VI   Pupils are equal, round, and reactive to light.  Extraocular motions are normal.   Right pupil: Size: 3 mm. Shape: regular. Reactivity: brisk. Consensual response: intact. Accommodation: intact.   Left pupil: Size: 3 mm. Shape: regular. Reactivity: brisk. Consensual response: intact. Accommodation: intact.   CN III: no CN III palsy  CN VI: no CN VI palsy  Nystagmus: none   Diplopia: none  Upgaze: normal  Downgaze: normal  Conjugate gaze: present  Vestibulo-ocular reflex: present    CN V   Facial sensation intact.   Right facial sensation deficit: none  Left facial sensation deficit: none  Right corneal reflex: normal  Left corneal reflex: normal    CN VII   Facial expression full, symmetric.   Right facial weakness: none  Left facial weakness: none  Right taste: normal  Left taste: normal    CN VIII   CN VIII normal.   Hearing: intact    CN IX, X   CN IX normal.   CN X normal.   Palate: symmetric    CN XI   CN XI normal.   Right sternocleidomastoid strength: normal  Left sternocleidomastoid strength: normal  Right trapezius strength: normal  Left trapezius strength: normal    CN XII    CN XII normal.   Tongue: not atrophic  Fasciculations: absent  Tongue deviation: none    MOTOR EXAM   Muscle bulk: normal  Overall muscle tone: normal  Right arm tone: normal  Left arm tone: normal  Right arm pronator drift: absent  Left arm pronator drift: absent  Right leg tone: normal  Left leg tone: normal    Strength   Right neck flexion: 5/5  Left neck flexion: 5/5  Right neck extension: 5/5  Left neck extension: 5/5  Right deltoid: 5/5  Left deltoid: 5/5  Right biceps: 5/5  Left biceps: 5/5  Right triceps: 5/5  Left triceps: 5/5  Right wrist flexion: 5/5  Left wrist flexion: 5/5  Right wrist extension: 5/5  Left wrist extension: 5/5  Right interossei: 5/5  Left interossei: 5/5  Right iliopsoas: /  Left iliopsoas:   Right quadriceps:   Left quadriceps: /  Right hamstrin/5  Left hamstrin/5  Right anterior tibial: 5  Left anterior tibial: 5  Right posterior tibial:   Left posterior tibial: /  Right gastroc: 5/  Left gastroc: 5/5    REFLEXES     Reflexes   Right brachioradialis: 2+  Left brachioradialis: 2+  Right biceps: 2+  Left biceps: 2+  Right triceps: 2+  Left triceps: 2+  Right patellar: 2+  Left patellar: 2+  Right achilles: 2+  Left achilles: 2+  Right plantar: normal  Left plantar: normal  Right Thorne: absent  Left Thorne: absent  Right ankle clonus: absent  Left ankle clonus: absent  Right pendular knee jerk: absent  Left pendular knee jerk: absent    SENSORY EXAM   Light touch normal.   Right arm light touch: normal  Left arm light touch: normal  Right leg light touch: normal  Left leg light touch: normal  Vibration normal.   Right arm vibration: normal  Left arm vibration: normal  Right leg vibration: normal  Left leg vibration: normal  Proprioception normal.   Right arm proprioception: normal  Left arm proprioception: normal  Right leg proprioception: normal  Left leg proprioception: normal  Pinprick normal.   Right arm pinprick: normal  Left arm pinprick:  normal  Right leg pinprick: normal  Left leg pinprick: normal  Graphesthesia: normal  Romberg: negative  Stereognosis: normal    GAIT AND COORDINATION     Gait  Gait: normal     Coordination   Finger to nose coordination: normal  Heel to shin coordination: normal  Tandem walking coordination: normal    Tremor   Resting tremor: absent  Intention tremor: absent  Action tremor: absent       Physical Exam  Vitals and nursing note reviewed.   Constitutional:       Appearance: Normal appearance.   HENT:      Head: Normocephalic.   Eyes:      Extraocular Movements: Extraocular movements intact and EOM normal.      Pupils: Pupils are equal, round, and reactive to light.   Cardiovascular:      Rate and Rhythm: Normal rate and regular rhythm.   Pulmonary:      Effort: Pulmonary effort is normal.      Breath sounds: Normal breath sounds.   Musculoskeletal:         General: No swelling or tenderness. Normal range of motion.      Cervical back: Normal range of motion and neck supple.      Right lower leg: No edema.      Left lower leg: No edema.   Skin:     General: Skin is warm and dry.      Coloration: Skin is not jaundiced.      Findings: No rash.   Neurological:      General: No focal deficit present.      Mental Status: She is alert and oriented to person, place, and time.      GCS: GCS eye subscore is 4. GCS verbal subscore is 5. GCS motor subscore is 6.      Cranial Nerves: No cranial nerve deficit.      Sensory: No sensory deficit.      Motor: Motor function is intact. No weakness.      Coordination: Coordination is intact. Coordination normal. Finger-Nose-Finger Test, Heel to Shin Test and Romberg Test normal.      Gait: Gait is intact. Gait and tandem walk normal.      Deep Tendon Reflexes: Reflexes normal.      Reflex Scores:       Tricep reflexes are 2+ on the right side and 2+ on the left side.       Bicep reflexes are 2+ on the right side and 2+ on the left side.       Brachioradialis reflexes are 2+ on the right  side and 2+ on the left side.       Patellar reflexes are 2+ on the right side and 2+ on the left side.       Achilles reflexes are 2+ on the right side and 2+ on the left side.  Psychiatric:         Mood and Affect: Mood normal.         Speech: Speech normal.         Behavior: Behavior normal.          Assessment:     Problem List Items Addressed This Visit          Neuro    Chronic migraine w/o aura, not intractable, w/o stat migr - Primary (Chronic)                Primary Diagnosis and ICD10  Chronic migraine w/o aura, not intractable, w/o stat migr [G43.709]    Plan:     Patient Instructions   1. Continue neurontin 300mg three times a day as directed    2. Continue maxalt as needed for migraine    3. Continue current plan of care    There are no discontinued medications.              Requested Prescriptions      No prescriptions requested or ordered in this encounter       No orders of the defined types were placed in this encounter.

## 2024-10-15 ENCOUNTER — PATIENT MESSAGE (OUTPATIENT)
Dept: RESEARCH | Facility: HOSPITAL | Age: 74
End: 2024-10-15

## 2025-04-02 ENCOUNTER — OFFICE VISIT (OUTPATIENT)
Dept: NEUROLOGY | Facility: CLINIC | Age: 75
End: 2025-04-02
Payer: MEDICARE

## 2025-04-02 VITALS
OXYGEN SATURATION: 97 % | HEIGHT: 64 IN | BODY MASS INDEX: 28.68 KG/M2 | DIASTOLIC BLOOD PRESSURE: 70 MMHG | SYSTOLIC BLOOD PRESSURE: 138 MMHG | WEIGHT: 168 LBS | RESPIRATION RATE: 18 BRPM | HEART RATE: 69 BPM

## 2025-04-02 DIAGNOSIS — G43.709 CHRONIC MIGRAINE W/O AURA, NOT INTRACTABLE, W/O STAT MIGR: Primary | Chronic | ICD-10-CM

## 2025-04-02 PROCEDURE — 99999 PR PBB SHADOW E&M-EST. PATIENT-LVL IV: CPT | Mod: PBBFAC,,, | Performed by: NURSE PRACTITIONER

## 2025-04-02 PROCEDURE — 99214 OFFICE O/P EST MOD 30 MIN: CPT | Mod: PBBFAC | Performed by: NURSE PRACTITIONER

## 2025-04-02 PROCEDURE — 99213 OFFICE O/P EST LOW 20 MIN: CPT | Mod: S$PBB,,, | Performed by: NURSE PRACTITIONER

## 2025-04-02 RX ORDER — CICLOPIROX 80 MG/ML
SOLUTION TOPICAL NIGHTLY
COMMUNITY
Start: 2025-03-26

## 2025-04-02 RX ORDER — KETOROLAC TROMETHAMINE 30 MG/ML
INJECTION, SOLUTION INTRAMUSCULAR; INTRAVENOUS
COMMUNITY
Start: 2024-02-26

## 2025-04-02 RX ORDER — RIZATRIPTAN BENZOATE 10 MG/1
TABLET ORAL
Qty: 12 TABLET | Refills: 2 | Status: SHIPPED | OUTPATIENT
Start: 2025-04-02

## 2025-04-02 RX ORDER — METHYLPREDNISOLONE ACETATE 40 MG/ML
INJECTION, SUSPENSION INTRA-ARTICULAR; INTRALESIONAL; INTRAMUSCULAR; SOFT TISSUE
COMMUNITY
Start: 2024-02-26

## 2025-04-02 RX ORDER — MAGNESIUM OXIDE 420 MG/1
TABLET ORAL
COMMUNITY

## 2025-04-02 RX ORDER — GABAPENTIN 300 MG/1
300 CAPSULE ORAL 2 TIMES DAILY
Qty: 180 CAPSULE | Refills: 1 | Status: SHIPPED | OUTPATIENT
Start: 2025-04-02

## 2025-04-02 NOTE — PROGRESS NOTES
Subjective:       Patient ID: Ciro Gutierrez is a 74 y.o. female     Chief Complaint:    No chief complaint on file.         Allergies:  Patient has no known allergies.    Current Medications:    Outpatient Encounter Medications as of 4/2/2025   Medication Sig Dispense Refill    ascorbic acid, vitamin C, 250 mg Chew Take 1 tablet by mouth once daily.      atorvastatin (LIPITOR) 40 MG tablet       celecoxib (CELEBREX) 200 MG capsule       ciclopirox (PENLAC) 8 % Soln Apply topically every evening.      docusate sodium (COLACE) 100 MG capsule Take 1 capsule twice a day by oral route.      ketorolac (TORADOL) 60 mg/2 mL Soln 60mg IM now      levothyroxine (SYNTHROID) 125 MCG tablet       methylPREDNISolone acetate (DEPO-MEDROL) 40 mg/mL injection 40mg IM now      [DISCONTINUED] gabapentin (NEURONTIN) 300 MG capsule Take 1 capsule (300 mg total) by mouth 2 (two) times daily. 180 capsule 1    [DISCONTINUED] rizatriptan (MAXALT) 10 MG tablet TAKE 1 TABLET AT ONSET OF HEADACHE.  MAY REPEAT IN 2 HOURS (NO MORE THAN 3 DAYS OF THE WEEK AS INSTRUCTED) 12 tablet 2    aspirin 81 mg Cap Take 1 capsule every day by oral route.      gabapentin (NEURONTIN) 300 MG capsule Take 1 capsule (300 mg total) by mouth 2 (two) times daily. 180 capsule 1    magnesium oxide 420 mg Tab Take by oral route.      rizatriptan (MAXALT) 10 MG tablet TAKE 1 TABLET AT ONSET OF HEADACHE.  MAY REPEAT IN 2 HOURS (NO MORE THAN 3 DAYS OF THE WEEK AS INSTRUCTED) 12 tablet 2     No facility-administered encounter medications on file as of 4/2/2025.       History of Present Illness  75 y/o female with h/o migraines.     Currently on Neurontin 300mg BID and tolerating very well.  Reports maybe 3 headaches a month and easily relieved with Maxalt, typically one dose will eliminate the headache.      Prior on Propranolol which worked well, but she felt too tired.  Prior topamax was avoided due to history of kidney stones.    Denies any other neurological  symptoms.           Review of Systems  Review of Systems   Constitutional:  Negative for diaphoresis and fever.   HENT:  Negative for congestion, hearing loss and tinnitus.    Eyes:  Negative for blurred vision, double vision, photophobia, discharge and redness.   Respiratory:  Negative for cough and shortness of breath.    Cardiovascular:  Negative for chest pain.   Gastrointestinal:  Negative for abdominal pain, nausea and vomiting.   Musculoskeletal:  Negative for back pain, joint pain, myalgias and neck pain.   Skin:  Negative for itching and rash.   Neurological:  Positive for headaches. Negative for dizziness, tremors, sensory change, speech change, focal weakness, seizures, loss of consciousness and weakness.   Psychiatric/Behavioral:  Negative for depression, hallucinations and memory loss. The patient does not have insomnia.    All other systems reviewed and are negative.     Objective:     NEUROLOGICAL EXAMINATION:     MENTAL STATUS   Oriented to person, place, and time.   Registration: recalls 3 of 3 objects. Recall at 5 minutes: recalls 3 of 3 objects.   Attention: normal. Concentration: normal.   Speech: speech is normal   Level of consciousness: alert  Knowledge: good and consistent with education.   Normal comprehension.     CRANIAL NERVES     CN II   Visual fields full to confrontation.   Visual acuity: normal  Right visual field deficit: none  Left visual field deficit: none     CN III, IV, VI   Pupils are equal, round, and reactive to light.  Extraocular motions are normal.   Right pupil: Size: 3 mm. Shape: regular. Reactivity: brisk. Consensual response: intact. Accommodation: intact.   Left pupil: Size: 3 mm. Shape: regular. Reactivity: brisk. Consensual response: intact. Accommodation: intact.   CN III: no CN III palsy  CN VI: no CN VI palsy  Nystagmus: none   Diplopia: none  Upgaze: normal  Downgaze: normal  Conjugate gaze: present  Vestibulo-ocular reflex: present    CN V   Facial sensation  intact.   Right facial sensation deficit: none  Left facial sensation deficit: none  Right corneal reflex: normal  Left corneal reflex: normal    CN VII   Facial expression full, symmetric.   Right facial weakness: none  Left facial weakness: none  Right taste: normal  Left taste: normal    CN VIII   CN VIII normal.   Hearing: intact    CN IX, X   CN IX normal.   CN X normal.   Palate: symmetric    CN XI   CN XI normal.   Right sternocleidomastoid strength: normal  Left sternocleidomastoid strength: normal  Right trapezius strength: normal  Left trapezius strength: normal    CN XII   CN XII normal.   Tongue: not atrophic  Fasciculations: absent  Tongue deviation: none    MOTOR EXAM   Muscle bulk: normal  Overall muscle tone: normal  Right arm tone: normal  Left arm tone: normal  Right arm pronator drift: absent  Left arm pronator drift: absent  Right leg tone: normal  Left leg tone: normal    Strength   Right neck flexion: 5/5  Left neck flexion: 5/5  Right neck extension: 5/5  Left neck extension: 5/5  Right deltoid: 5/5  Left deltoid: 5/5  Right biceps: 5/5  Left biceps: 5/5  Right triceps: 5/5  Left triceps: 5/5  Right wrist flexion: 5/5  Left wrist flexion: 5/5  Right wrist extension: 5/5  Left wrist extension: 5/5  Right interossei: 5/5  Left interossei: 5/5  Right iliopsoas: 5/5  Left iliopsoas: 5/5  Right quadriceps: 5/5  Left quadriceps: 5/5  Right hamstrin/5  Left hamstrin/5  Right anterior tibial: 5/5  Left anterior tibial: 5/5  Right posterior tibial: 5/5  Left posterior tibial: 5/5  Right gastroc: 5/5  Left gastroc: 5/5    REFLEXES     Reflexes   Right brachioradialis: 2+  Left brachioradialis: 2+  Right biceps: 2+  Left biceps: 2+  Right triceps: 2+  Left triceps: 2+  Right patellar: 2+  Left patellar: 2+  Right achilles: 2+  Left achilles: 2+  Right plantar: normal  Left plantar: normal  Right Thorne: absent  Left Thorne: absent  Right ankle clonus: absent  Left ankle clonus: absent  Right  pendular knee jerk: absent  Left pendular knee jerk: absent    SENSORY EXAM   Light touch normal.   Right arm light touch: normal  Left arm light touch: normal  Right leg light touch: normal  Left leg light touch: normal  Vibration normal.   Right arm vibration: normal  Left arm vibration: normal  Right leg vibration: normal  Left leg vibration: normal  Proprioception normal.   Right arm proprioception: normal  Left arm proprioception: normal  Right leg proprioception: normal  Left leg proprioception: normal  Pinprick normal.   Right arm pinprick: normal  Left arm pinprick: normal  Right leg pinprick: normal  Left leg pinprick: normal  Graphesthesia: normal  Romberg: negative  Stereognosis: normal    GAIT AND COORDINATION     Gait  Gait: normal     Coordination   Finger to nose coordination: normal  Heel to shin coordination: normal  Tandem walking coordination: normal    Tremor   Resting tremor: absent  Intention tremor: absent  Action tremor: absent       Physical Exam  Vitals and nursing note reviewed.   Constitutional:       Appearance: Normal appearance.   HENT:      Head: Normocephalic.   Eyes:      Extraocular Movements: Extraocular movements intact and EOM normal.      Pupils: Pupils are equal, round, and reactive to light.   Cardiovascular:      Rate and Rhythm: Normal rate and regular rhythm.   Pulmonary:      Effort: Pulmonary effort is normal.      Breath sounds: Normal breath sounds.   Musculoskeletal:         General: No swelling or tenderness. Normal range of motion.      Cervical back: Normal range of motion and neck supple.      Right lower leg: No edema.      Left lower leg: No edema.   Skin:     General: Skin is warm and dry.      Coloration: Skin is not jaundiced.      Findings: No rash.   Neurological:      General: No focal deficit present.      Mental Status: She is alert and oriented to person, place, and time.      GCS: GCS eye subscore is 4. GCS verbal subscore is 5. GCS motor subscore is  6.      Cranial Nerves: No cranial nerve deficit.      Sensory: No sensory deficit.      Motor: Motor function is intact. No weakness.      Coordination: Coordination is intact. Coordination normal. Finger-Nose-Finger Test, Heel to Shin Test and Romberg Test normal.      Gait: Gait is intact. Gait and tandem walk normal.      Deep Tendon Reflexes: Reflexes normal.      Reflex Scores:       Tricep reflexes are 2+ on the right side and 2+ on the left side.       Bicep reflexes are 2+ on the right side and 2+ on the left side.       Brachioradialis reflexes are 2+ on the right side and 2+ on the left side.       Patellar reflexes are 2+ on the right side and 2+ on the left side.       Achilles reflexes are 2+ on the right side and 2+ on the left side.  Psychiatric:         Mood and Affect: Mood normal.         Speech: Speech normal.         Behavior: Behavior normal.          Assessment:     Problem List Items Addressed This Visit          Neuro    Chronic migraine w/o aura, not intractable, w/o stat migr - Primary (Chronic)    Relevant Medications    gabapentin (NEURONTIN) 300 MG capsule    rizatriptan (MAXALT) 10 MG tablet                  Primary Diagnosis and ICD10  Chronic migraine w/o aura, not intractable, w/o stat migr [G43.709]    Plan:     Patient Instructions   1. Continue neurontin 300mg three times a day as directed    2. Continue maxalt as needed for migraine    3. Continue current plan of care    Medications Discontinued During This Encounter   Medication Reason    rizatriptan (MAXALT) 10 MG tablet Reorder    gabapentin (NEURONTIN) 300 MG capsule Reorder                 Requested Prescriptions     Signed Prescriptions Disp Refills    gabapentin (NEURONTIN) 300 MG capsule 180 capsule 1     Sig: Take 1 capsule (300 mg total) by mouth 2 (two) times daily.    rizatriptan (MAXALT) 10 MG tablet 12 tablet 2     Sig: TAKE 1 TABLET AT ONSET OF HEADACHE.  MAY REPEAT IN 2 HOURS (NO MORE THAN 3 DAYS OF THE WEEK AS  INSTRUCTED)       No orders of the defined types were placed in this encounter.

## 2025-04-16 ENCOUNTER — TELEPHONE (OUTPATIENT)
Dept: NEUROLOGY | Facility: CLINIC | Age: 75
End: 2025-04-16
Payer: MEDICARE

## 2025-04-16 DIAGNOSIS — G43.709 CHRONIC MIGRAINE W/O AURA, NOT INTRACTABLE, W/O STAT MIGR: Chronic | ICD-10-CM

## 2025-04-16 RX ORDER — GABAPENTIN 300 MG/1
300 CAPSULE ORAL 2 TIMES DAILY
Qty: 180 CAPSULE | Refills: 1 | Status: SHIPPED | OUTPATIENT
Start: 2025-04-16

## 2025-04-16 NOTE — TELEPHONE ENCOUNTER
Called pt she states wants rx sent to [facility] in [place].            ----- Message from Nicole sent at 4/16/2025 11:10 AM CDT -----  Regarding: RX Refill - Diff Pharmacy  Who Called: Ciro GutierrezRefill or New Rx:RefillRX Name and Strength:gabapentin (NEURONTIN) 300 MG capsuleHow is the patient currently taking it? (ex. 1XDay):2xdayIs this a 30 day or 90 day RX:90Local or Mail Order:LocalList of preferred pharmacies on file (remove unneeded): [unfilled][facility] - [place]IREZ [place] [address] [id number] [place] [facility] [place] [address]: [phone] Fax: [phone]: Not open 24 hoursShe said to please resend it to the pharmacy aboveOrdering Provider:Andrew Stout Method of Contact: Phone CallPatient's Preferred Phone Number on File: [phone]